# Patient Record
Sex: MALE | Race: WHITE | NOT HISPANIC OR LATINO | URBAN - METROPOLITAN AREA
[De-identification: names, ages, dates, MRNs, and addresses within clinical notes are randomized per-mention and may not be internally consistent; named-entity substitution may affect disease eponyms.]

---

## 2017-01-05 DIAGNOSIS — G89.29 CHRONIC RADICULAR CERVICAL PAIN: Primary | ICD-10-CM

## 2017-01-05 DIAGNOSIS — M54.12 CHRONIC RADICULAR CERVICAL PAIN: Primary | ICD-10-CM

## 2017-01-05 DIAGNOSIS — Z51.81 THERAPEUTIC DRUG MONITORING: ICD-10-CM

## 2017-01-12 ENCOUNTER — LAB (OUTPATIENT)
Dept: LAB | Facility: HOSPITAL | Age: 72
End: 2017-01-12

## 2017-01-12 ENCOUNTER — OFFICE VISIT (OUTPATIENT)
Dept: PALLIATIVE CARE | Facility: CLINIC | Age: 72
End: 2017-01-12

## 2017-01-12 VITALS
HEART RATE: 81 BPM | WEIGHT: 146.3 LBS | DIASTOLIC BLOOD PRESSURE: 63 MMHG | BODY MASS INDEX: 24.37 KG/M2 | RESPIRATION RATE: 18 BRPM | SYSTOLIC BLOOD PRESSURE: 137 MMHG | HEIGHT: 65 IN

## 2017-01-12 DIAGNOSIS — M62.838 NECK MUSCLE SPASM: ICD-10-CM

## 2017-01-12 DIAGNOSIS — Z51.81 THERAPEUTIC DRUG MONITORING: ICD-10-CM

## 2017-01-12 DIAGNOSIS — M54.12 CHRONIC RADICULAR CERVICAL PAIN: ICD-10-CM

## 2017-01-12 DIAGNOSIS — R06.00 DYSPNEA AND RESPIRATORY ABNORMALITIES: ICD-10-CM

## 2017-01-12 DIAGNOSIS — M50.20 CERVICAL DISCOGENIC PAIN SYNDROME: ICD-10-CM

## 2017-01-12 DIAGNOSIS — R07.1 CHEST PAIN VARYING WITH BREATHING: Primary | ICD-10-CM

## 2017-01-12 DIAGNOSIS — R06.89 DYSPNEA AND RESPIRATORY ABNORMALITIES: ICD-10-CM

## 2017-01-12 DIAGNOSIS — G89.29 CHRONIC RADICULAR CERVICAL PAIN: ICD-10-CM

## 2017-01-12 PROBLEM — F41.9 ANXIETY: Status: ACTIVE | Noted: 2017-01-12

## 2017-01-12 PROBLEM — R09.89 AIR HUNGER: Status: ACTIVE | Noted: 2017-01-12

## 2017-01-12 PROBLEM — R53.81 DECLINING FUNCTIONAL STATUS: Status: ACTIVE | Noted: 2017-01-12

## 2017-01-12 PROBLEM — M47.812 OSTEOARTHRITIS OF CERVICAL SPINE: Status: ACTIVE | Noted: 2017-01-12

## 2017-01-12 PROBLEM — J84.112 IPF (IDIOPATHIC PULMONARY FIBROSIS) (HCC): Status: ACTIVE | Noted: 2017-01-12

## 2017-01-12 PROBLEM — E03.9 HYPOTHYROIDISM: Status: ACTIVE | Noted: 2017-01-12

## 2017-01-12 PROBLEM — M17.9 OSTEOARTHRITIS OF KNEE: Status: ACTIVE | Noted: 2017-01-12

## 2017-01-12 PROBLEM — G25.81 RESTLESS LEGS SYNDROME: Status: ACTIVE | Noted: 2017-01-12

## 2017-01-12 LAB
AMPHET+METHAMPHET UR QL: NEGATIVE
AMPHETAMINES UR QL: NEGATIVE
BARBITURATES UR QL SCN: NEGATIVE
BENZODIAZ UR QL SCN: POSITIVE
BUPRENORPHINE SERPL-MCNC: NEGATIVE NG/ML
CANNABINOIDS SERPL QL: NEGATIVE
COCAINE UR QL: NEGATIVE
METHADONE UR QL SCN: NEGATIVE
OPIATES UR QL: POSITIVE
OXYCODONE UR QL SCN: POSITIVE
PCP UR QL SCN: NEGATIVE
PROPOXYPH UR QL: NEGATIVE
TRICYCLICS UR QL SCN: NEGATIVE

## 2017-01-12 PROCEDURE — 99215 OFFICE O/P EST HI 40 MIN: CPT | Performed by: INTERNAL MEDICINE

## 2017-01-12 PROCEDURE — 99497 ADVNCD CARE PLAN 30 MIN: CPT | Performed by: INTERNAL MEDICINE

## 2017-01-12 PROCEDURE — 80306 DRUG TEST PRSMV INSTRMNT: CPT | Performed by: INTERNAL MEDICINE

## 2017-01-12 RX ORDER — DIAZEPAM 5 MG/1
5 TABLET ORAL 2 TIMES DAILY PRN
Qty: 60 TABLET | Refills: 1 | Status: SHIPPED | OUTPATIENT
Start: 2017-01-12 | End: 2017-03-16 | Stop reason: SDUPTHER

## 2017-01-12 RX ORDER — PREDNISONE 1 MG/1
1 TABLET ORAL EVERY OTHER DAY
COMMUNITY
Start: 2017-01-07

## 2017-01-12 RX ORDER — MORPHINE SULFATE 30 MG/1
30 TABLET, FILM COATED, EXTENDED RELEASE ORAL EVERY 8 HOURS SCHEDULED
Qty: 90 TABLET | Refills: 0 | Status: SHIPPED | OUTPATIENT
Start: 2017-01-12 | End: 2017-01-12 | Stop reason: SDUPTHER

## 2017-01-12 RX ORDER — MORPHINE SULFATE 30 MG/1
30 TABLET, FILM COATED, EXTENDED RELEASE ORAL EVERY 8 HOURS SCHEDULED
Qty: 90 TABLET | Refills: 0 | Status: SHIPPED | OUTPATIENT
Start: 2017-02-11 | End: 2017-03-16 | Stop reason: SDUPTHER

## 2017-01-12 RX ORDER — OMEPRAZOLE 20 MG/1
20 CAPSULE, DELAYED RELEASE ORAL DAILY
COMMUNITY

## 2017-01-12 RX ORDER — OXYCODONE AND ACETAMINOPHEN 10; 325 MG/1; MG/1
1 TABLET ORAL EVERY 4 HOURS PRN
Qty: 180 TABLET | Refills: 0 | Status: SHIPPED | OUTPATIENT
Start: 2017-01-12 | End: 2017-01-12 | Stop reason: SDUPTHER

## 2017-01-12 RX ORDER — OXYCODONE AND ACETAMINOPHEN 10; 325 MG/1; MG/1
1 TABLET ORAL EVERY 4 HOURS PRN
Qty: 180 TABLET | Refills: 0 | Status: SHIPPED | OUTPATIENT
Start: 2017-02-28 | End: 2017-03-16 | Stop reason: SDUPTHER

## 2017-01-12 NOTE — LETTER
"January 12, 2017     Vivian Lim DO  [address] [place] [address] [address]  [facility] [address]    Patient: Paras Coles   YOB: 1945   Date of Visit: 1/12/2017       Dear Dr. Carina DO:    Thank you for referring Paras Coles to me for evaluation. Below are the relevant portions of my assessment and plan of care.    If you have questions, please do not hesitate to call me. I look forward to following Paras along with you.         Sincerely,        Lena Rehman MD        CC: MD Lena Morley MD  1/12/2017  2:30 PM  Sign at close encounter  Subjective   Paras Coles is a 71 y.o. male.     History of Present Illness    71yowm with idiopathic pulmonary fibrosis and chronic neck and back pain related to severe DJD of cervical spine with frequent neuropathic pain and numbness of bilateral hands. He has been on chronic opioid therapy for cervical spine DJD and has had conservative increase in opioid to also address air hunger. Has been symptomatically stable and still able to put in a few hours of work weekly.    Pain:  Increase in mid back pain, worse with breathing and noted with periods of air hunger.  Feels different from MSK pain of c-spine DDD.  Feels like \"it's my lungs\".  Achiness on inside.  Using Percocet 4-6 times daily.    C/o reflux with metallic taste over past week.  Dyspepsia and sharp esophageal pains after eating.  Started OTC PPI this week.      Dyspnea:  Overall a little worse but no acute cough, fever.  F/u with Dr. Carty every 3 months.    Advance care planning discussion (20min):  Pt brought copy of LW.  Wife, Natalya \"Shruti\", present.  She understands he does not want prolonged ventilation, however she is uncomfortable with \"no CPR\".  He agrees with her as he has good quality of life right now.  We discussed that palliative services are available at all [facility].        The following portions of the patient's " history were reviewed and updated as appropriate: allergies, current medications, past family history, past medical history, past social history, past surgical history and problem list.    Review of Systems    Last BM: daily    ESAS:  See flowsheet.    KPS: 70 - Cares for self; unable to carry on normal activity or do normal work     PPS: See flowsheet    ECOG: (1) Restricted in physically strenuous activity, ambulatory and able to do work of light nature    [facility] [id number].  No concerns    Opioid Risk Tool low risk    Medication counts did not bring    Pain Management Panel     Pain Management Panel Latest Ref Rng 1/12/2017 11/17/2016    AMPHETAMINES SCREEN, URINE Negative Negative Negative    BARBITURATES SCREEN Negative Negative Negative    BENZODIAZEPINE SCREEN, URINE Negative Positive(A) Positive(A)    BUPRENORPHINE Negative Negative Negative    COCAINE SCREEN, URINE Negative Negative Negative    METHADONE SCREEN, URINE Negative Negative Negative    METHAMPHETAMINE UR Negative Negative Negative          UDS: positive for prescription drugs    SUSAN-7: 0    PHQ-9:  See flowsheet    Living Will:    Yes- name(s)- Natalya Coles  on file    Objective   Physical Exam   General  WNWD, supplemental oxygen, conversant, NAD  PULM:  Bilateral crackles, good effort, reproducible back pain with deep respirations  CV:  RRR no m/r/g, trace LLE pretibial edema  GI:  +BS, soft, ntnd  MSK:  RLE amputee with prosthesis, normal gait, able to turn head 45 deg to left to talk but mainly moves whole body to look to right  Psych:   Normal affect  Neuro:  No hyperalgesia, no tremor, no rigidity    No results found for: NA, K, CL, CO2, BUN, CREATININE, LABGLOM, GLUCOSE, CALCIUM    Lab Results   Component Value Date    BUPRENORSCNU Negative 01/12/2017    PROPOXSCN Negative 01/12/2017    OXYCODONESCN Positive (A) 01/12/2017    BARBITSCNUR Negative 01/12/2017    LABMETHSCN Negative 01/12/2017    TRICYCLICSCN Negative 01/12/2017     LABBENZSCN Positive (A) 01/12/2017    AMPHETSCREEN Negative 01/12/2017    LABOPIASCN Positive (A) 01/12/2017    METAMPSCNUR Negative 01/12/2017    COCAINEUR Negative 01/12/2017    PCPUR Negative 01/12/2017    THCURSCR Negative 01/12/2017       Assessment/Plan   Paras was seen today for pain and shortness of breath.    Diagnoses and all orders for this visit:    Chest pain varying with breathing  -     Discontinue: Morphine (MS CONTIN) 30 MG 12 hr tablet; Take 1 tablet by mouth Every 8 (Eight) Hours.  -     Morphine (MS CONTIN) 30 MG 12 hr tablet; Take 1 tablet by mouth Every 8 (Eight) Hours.    Dyspnea and respiratory abnormalities  -     Discontinue: Morphine (MS CONTIN) 30 MG 12 hr tablet; Take 1 tablet by mouth Every 8 (Eight) Hours.  -     Discontinue: oxyCODONE-acetaminophen (PERCOCET)  MG per tablet; Take 1 tablet by mouth Every 4 (Four) Hours As Needed for moderate pain (4-6).  -     Morphine (MS CONTIN) 30 MG 12 hr tablet; Take 1 tablet by mouth Every 8 (Eight) Hours.  -     oxyCODONE-acetaminophen (PERCOCET)  MG per tablet; Take 1 tablet by mouth Every 4 (Four) Hours As Needed for moderate pain (4-6).    Cervical discogenic pain syndrome  -     Discontinue: oxyCODONE-acetaminophen (PERCOCET)  MG per tablet; Take 1 tablet by mouth Every 4 (Four) Hours As Needed for moderate pain (4-6).  -     oxyCODONE-acetaminophen (PERCOCET)  MG per tablet; Take 1 tablet by mouth Every 4 (Four) Hours As Needed for moderate pain (4-6).    Neck muscle spasm  -     diazePAM (VALIUM) 5 MG tablet; Take 1 tablet by mouth 2 (Two) Times a Day As Needed for muscle spasms.    Other orders  -     Full Code    Increased MSSR from 30mg BID to 30mg q8h.      No other medication changes.    Advance care planning discussion as above.    Follow up in 2 months.  Noted that patient will be in [place] for the month of February

## 2017-01-12 NOTE — PROGRESS NOTES
"Subjective   Paras Coles is a 71 y.o. male.     History of Present Illness    71yowm with idiopathic pulmonary fibrosis and chronic neck and back pain related to severe DJD of cervical spine with frequent neuropathic pain and numbness of bilateral hands. He has been on chronic opioid therapy for cervical spine DJD and has had conservative increase in opioid to also address air hunger. Has been symptomatically stable and still able to put in a few hours of work weekly.    Pain:  Increase in mid back pain, worse with breathing and noted with periods of air hunger.  Feels different from MSK pain of c-spine DDD.  Feels like \"it's my lungs\".  Achiness on inside.  Using Percocet 4-6 times daily.    C/o reflux with metallic taste over past week.  Dyspepsia and sharp esophageal pains after eating.  Started OTC PPI this week.      Dyspnea:  Overall a little worse but no acute cough, fever.  F/u with Dr. Carty every 3 months.    Advance care planning discussion (20min):  Pt brought copy of LW.  Wife, Natalya \"Shruti\", present.  She understands he does not want prolonged ventilation, however she is uncomfortable with \"no CPR\".  He agrees with her as he has good quality of life right now.  We discussed that palliative services are available at all [facility].        The following portions of the patient's history were reviewed and updated as appropriate: allergies, current medications, past family history, past medical history, past social history, past surgical history and problem list.    Review of Systems    Last BM: daily    ESAS:  See flowsheet.    KPS: 70 - Cares for self; unable to carry on normal activity or do normal work     PPS: See flowsheet    ECOG: (1) Restricted in physically strenuous activity, ambulatory and able to do work of light nature    [facility] [id number].  No concerns    Opioid Risk Tool low risk    Medication counts did not bring    Pain Management Panel     Pain Management Panel " Latest Ref Rng 1/12/2017 11/17/2016    AMPHETAMINES SCREEN, URINE Negative Negative Negative    BARBITURATES SCREEN Negative Negative Negative    BENZODIAZEPINE SCREEN, URINE Negative Positive(A) Positive(A)    BUPRENORPHINE Negative Negative Negative    COCAINE SCREEN, URINE Negative Negative Negative    METHADONE SCREEN, URINE Negative Negative Negative    METHAMPHETAMINE UR Negative Negative Negative          UDS: positive for prescription drugs    SUSAN-7: 0    PHQ-9:  See flowsheet    Living Will:    Yes- name(s)- Natalya Coles  on file    Objective   Physical Exam   General  WNWD, supplemental oxygen, conversant, NAD  PULM:  Bilateral crackles, good effort, reproducible back pain with deep respirations  CV:  RRR no m/r/g, trace LLE pretibial edema  GI:  +BS, soft, ntnd  MSK:  RLE amputee with prosthesis, normal gait, able to turn head 45 deg to left to talk but mainly moves whole body to look to right  Psych:   Normal affect  Neuro:  No hyperalgesia, no tremor, no rigidity    No results found for: NA, K, CL, CO2, BUN, CREATININE, LABGLOM, GLUCOSE, CALCIUM    Lab Results   Component Value Date    BUPRENORSCNU Negative 01/12/2017    PROPOXSCN Negative 01/12/2017    OXYCODONESCN Positive (A) 01/12/2017    BARBITSCNUR Negative 01/12/2017    LABMETHSCN Negative 01/12/2017    TRICYCLICSCN Negative 01/12/2017    LABBENZSCN Positive (A) 01/12/2017    AMPHETSCREEN Negative 01/12/2017    LABOPIASCN Positive (A) 01/12/2017    METAMPSCNUR Negative 01/12/2017    COCAINEUR Negative 01/12/2017    PCPUR Negative 01/12/2017    THCURSCR Negative 01/12/2017       Assessment/Plan   Paras was seen today for pain and shortness of breath.    Diagnoses and all orders for this visit:    Chest pain varying with breathing  -     Discontinue: Morphine (MS CONTIN) 30 MG 12 hr tablet; Take 1 tablet by mouth Every 8 (Eight) Hours.  -     Morphine (MS CONTIN) 30 MG 12 hr tablet; Take 1 tablet by mouth Every 8 (Eight) Hours.    Dyspnea  and respiratory abnormalities  -     Discontinue: Morphine (MS CONTIN) 30 MG 12 hr tablet; Take 1 tablet by mouth Every 8 (Eight) Hours.  -     Discontinue: oxyCODONE-acetaminophen (PERCOCET)  MG per tablet; Take 1 tablet by mouth Every 4 (Four) Hours As Needed for moderate pain (4-6).  -     Morphine (MS CONTIN) 30 MG 12 hr tablet; Take 1 tablet by mouth Every 8 (Eight) Hours.  -     oxyCODONE-acetaminophen (PERCOCET)  MG per tablet; Take 1 tablet by mouth Every 4 (Four) Hours As Needed for moderate pain (4-6).    Cervical discogenic pain syndrome  -     Discontinue: oxyCODONE-acetaminophen (PERCOCET)  MG per tablet; Take 1 tablet by mouth Every 4 (Four) Hours As Needed for moderate pain (4-6).  -     oxyCODONE-acetaminophen (PERCOCET)  MG per tablet; Take 1 tablet by mouth Every 4 (Four) Hours As Needed for moderate pain (4-6).    Neck muscle spasm  -     diazePAM (VALIUM) 5 MG tablet; Take 1 tablet by mouth 2 (Two) Times a Day As Needed for muscle spasms.    Other orders  -     Full Code    Increased MSSR from 30mg BID to 30mg q8h.      No other medication changes.    Advance care planning discussion as above.    Follow up in 2 months.  Noted that patient will be in [place] for the month of February

## 2017-01-12 NOTE — MR AVS SNAPSHOT
Paras Coles   1/12/2017 12:00 PM   Office Visit    Dept Phone:  [phone]   Encounter #:  [id number]    Provider:  Lena Rehman MD   Department:  [facility]                Your Full Care Plan              Today's Medication Changes          These changes are accurate as of: 1/12/17  1:28 PM.  If you have any questions, ask your nurse or doctor.               Medication(s)that have changed:     Morphine 30 MG 12 hr tablet   Commonly known as:  MS CONTIN   Take 1 tablet by mouth Every 8 (Eight) Hours.   Start taking on:  2/11/2017   What changed:    - when to take this  - Another medication with the same name was removed. Continue taking this medication, and follow the directions you see here.   Changed by:  Lena Rehman MD       oxyCODONE-acetaminophen  MG per tablet   Commonly known as:  PERCOCET   Take 1 tablet by mouth Every 4 (Four) Hours As Needed for moderate pain (4-6).   Start taking on:  2/28/2017   What changed:  Another medication with the same name was removed. Continue taking this medication, and follow the directions you see here.   Changed by:  Lena Rehman MD       predniSONE 5 MG tablet   Commonly known as:  DELTASONE   Take 1 tablet by mouth Every Other Day.   What changed:  Another medication with the same name was removed. Continue taking this medication, and follow the directions you see here.   Changed by:  Lena Rehman MD            Where to Get Your Medications      You can get these medications from any pharmacy     Bring a paper prescription for each of these medications     diazePAM 5 MG tablet    Morphine 30 MG 12 hr tablet    oxyCODONE-acetaminophen  MG per tablet                  Your Updated Medication List          This list is accurate as of: 1/12/17  1:28 PM.  Always use your most recent med list.                albuterol (2.5 MG/3ML) 0.083% nebulizer solution   Commonly known as:  PROVENTIL       AVALIDE  150-12.5 MG tablet   Generic drug:  irbesartan-hydrochlorothiazide       bisoprolol 5 MG tablet   Commonly known as:  ZEBeta       diazePAM 5 MG tablet   Commonly known as:  VALIUM   Take 1 tablet by mouth 2 (Two) Times a Day As Needed for muscle spasms.       ibuprofen 800 MG tablet   Commonly known as:  ADVIL,MOTRIN   Take 1 tablet by mouth Every 8 (Eight) Hours As Needed (for 3 days maximum PRN arthritis pain).       Morphine 30 MG 12 hr tablet   Commonly known as:  MS CONTIN   Take 1 tablet by mouth Every 8 (Eight) Hours.   Start taking on:  2/11/2017       O2   Commonly known as:  OXYGEN       omeprazole 20 MG capsule   Commonly known as:  priLOSEC       oxyCODONE-acetaminophen  MG per tablet   Commonly known as:  PERCOCET   Take 1 tablet by mouth Every 4 (Four) Hours As Needed for moderate pain (4-6).   Start taking on:  2/28/2017       PLAVIX 75 MG tablet   Generic drug:  clopidogrel       predniSONE 5 MG tablet   Commonly known as:  DELTASONE       SYNTHROID 100 MCG tablet   Generic drug:  levothyroxine       ZOFRAN ODT 4 MG disintegrating tablet   Generic drug:  ondansetron ODT               We Performed the Following     Full Code       You Were Diagnosed With        Codes Comments    Chest pain varying with breathing    -  Primary ICD-10-CM: R07.9  ICD-9-CM: 786.50     Dyspnea and respiratory abnormalities     ICD-10-CM: R06.00, R06.89  ICD-9-CM: 786.09     Cervical discogenic pain syndrome     ICD-10-CM: M50.20  ICD-9-CM: 722.0     Neck muscle spasm     ICD-10-CM: M62.838  ICD-9-CM: 728.85       Instructions     None    Patient Instructions History      Upcoming Appointments     Visit Type Date Time Department    FOLLOW UP 1/12/2017 12:00 PM [facility]    LAB 1/12/2017 12:45 PM  [facility]    FOLLOW UP 3/16/2017 10:00 AM [facility]      MyChart Signup     Our records indicate that you have an active [employer] account.    You can view your After Visit Summary by  "going to [employer] and logging in with your [employer] username and password.  If you don't have a [employer] username and password but a parent or guardian has access to your record, the parent or guardian should login with their own [employer] username and password and access your record to view the After Visit Summary.    If you have questions, you can email [email or web address] or call [phone] to talk to our [employer] staff.  Remember, [employer] is NOT to be used for urgent needs.  For medical emergencies, dial 911.               Other Info from Your Visit           Your Appointments     Mar 16, 2017 10:00 AM EDT   Follow Up with Lena Rehman MD   [facility] (--)    [address] [place] [address] [address]  [place] [address]   [phone]           Arrive 15 minutes prior to appointment.              Allergies     Morphine  Nausea Only      Reason for Visit     Pain     Shortness of Breath           Vital Signs     Blood Pressure Pulse Respirations Height Weight Body Mass Index    137/63 81 18 65\" (165.1 cm) 146 lb 4.8 oz (66.4 kg) 24.35 kg/m2    Smoking Status                   Former Smoker           Problems and Diagnoses Noted     Air hunger    Anxiety problem    Bulging disc of backbone    Chronic pain    Declining functional status    Trouble breathing    Underactive thyroid    Scarring of lung    Degenerative arthritis of cervical spine    Degenerative arthritis of knee    Restless legs syndrome    Chest pain varying with breathing    -  Primary    Neck muscle spasm            "

## 2017-01-12 NOTE — PROGRESS NOTES
Visit with Dr. Rehman. Pt has his wife, Shruti with him today.  Pt having increased pain and shortness of breath. Treatment plan discussed to add additional pain med at mid day. Pt is able to work a few hours and able to go out with his wife to eat. Pt had brought his wife in to continue advanced care discussion. Shruti shares that if pt were to have a heart attack, she would be unable to let him go without allowing EMTs to do CPR as he still has quality of life at this time. Shruti is very supportive of pt and his wishes but since the pt is still functioning, no resusciation is very difficult. Validated how difficult these decisions are to make at this time as situations change. Pt also is not quite at the point of no resuscitation but trying to take weight of decisions off of his wife. . Dr. Rehman suggested pt talk with lung doctor about if he would even be able to tolerate a ventilator if this were to be the scenario. Pt does have a living will. Provided the couple with a copy of the EMS/DNR for them to review.  Couple are planning a trip to [place] through the month of February.

## 2017-03-16 ENCOUNTER — OFFICE VISIT (OUTPATIENT)
Dept: PALLIATIVE CARE | Facility: CLINIC | Age: 72
End: 2017-03-16

## 2017-03-16 VITALS
OXYGEN SATURATION: 90 % | WEIGHT: 146 LBS | DIASTOLIC BLOOD PRESSURE: 65 MMHG | TEMPERATURE: 99.7 F | BODY MASS INDEX: 24.3 KG/M2 | RESPIRATION RATE: 18 BRPM | HEART RATE: 90 BPM | SYSTOLIC BLOOD PRESSURE: 142 MMHG

## 2017-03-16 DIAGNOSIS — R06.89 DYSPNEA AND RESPIRATORY ABNORMALITIES: ICD-10-CM

## 2017-03-16 DIAGNOSIS — M50.20 CERVICAL DISCOGENIC PAIN SYNDROME: ICD-10-CM

## 2017-03-16 DIAGNOSIS — R06.00 DYSPNEA AND RESPIRATORY ABNORMALITIES: ICD-10-CM

## 2017-03-16 DIAGNOSIS — M62.838 NECK MUSCLE SPASM: ICD-10-CM

## 2017-03-16 DIAGNOSIS — R09.89 AIR HUNGER: Primary | ICD-10-CM

## 2017-03-16 DIAGNOSIS — R07.1 CHEST PAIN VARYING WITH BREATHING: ICD-10-CM

## 2017-03-16 PROCEDURE — 99214 OFFICE O/P EST MOD 30 MIN: CPT | Performed by: INTERNAL MEDICINE

## 2017-03-16 RX ORDER — MORPHINE SULFATE 30 MG/1
30 TABLET, FILM COATED, EXTENDED RELEASE ORAL EVERY 8 HOURS SCHEDULED
Qty: 90 TABLET | Refills: 0 | Status: SHIPPED | OUTPATIENT
Start: 2017-03-29 | End: 2017-04-28

## 2017-03-16 RX ORDER — CEFDINIR 300 MG/1
CAPSULE ORAL
Refills: 0 | COMMUNITY
Start: 2017-03-09

## 2017-03-16 RX ORDER — DIAZEPAM 5 MG/1
5 TABLET ORAL 2 TIMES DAILY PRN
Qty: 60 TABLET | Refills: 0 | Status: SHIPPED | OUTPATIENT
Start: 2017-05-08 | End: 2017-06-07

## 2017-03-16 RX ORDER — OXYCODONE AND ACETAMINOPHEN 10; 325 MG/1; MG/1
1 TABLET ORAL EVERY 4 HOURS PRN
Qty: 180 TABLET | Refills: 0 | Status: SHIPPED | OUTPATIENT
Start: 2017-05-08 | End: 2017-06-07

## 2017-03-16 RX ORDER — MORPHINE SULFATE 30 MG/1
30 TABLET, FILM COATED, EXTENDED RELEASE ORAL EVERY 8 HOURS SCHEDULED
Qty: 90 TABLET | Refills: 0 | Status: SHIPPED | OUTPATIENT
Start: 2017-04-28 | End: 2017-05-28

## 2017-03-16 RX ORDER — OXYCODONE AND ACETAMINOPHEN 10; 325 MG/1; MG/1
1 TABLET ORAL EVERY 4 HOURS PRN
Qty: 180 TABLET | Refills: 0 | Status: SHIPPED | OUTPATIENT
Start: 2017-04-08 | End: 2017-05-08

## 2017-03-16 RX ORDER — DIAZEPAM 5 MG/1
5 TABLET ORAL 2 TIMES DAILY PRN
Qty: 56 TABLET | Refills: 0 | Status: SHIPPED | OUTPATIENT
Start: 2017-04-10 | End: 2017-05-08

## 2017-03-16 RX ORDER — AZITHROMYCIN 250 MG/1
TABLET, FILM COATED ORAL
Refills: 0 | COMMUNITY
Start: 2017-03-09

## 2017-03-16 NOTE — PATIENT INSTRUCTIONS
Call [phone] for any questions regarding medication or plan of care.     Clinic days are Tuesday and Thursdays at this time. If there is an urgent question, please ask that an on call clinician call you back. Otherwise, we will return calls and process prescription requests during clinic hours.     Call [phone] only for scheduling issues.

## 2017-03-16 NOTE — PROGRESS NOTES
Subjective   Paras Coles is a 71 y.o. male.     History of Present Illness   71yowm with idiopathic pulmonary fibrosis and chronic neck and back pain related to severe DJD of cervical spine with frequent neuropathic pain and numbness of bilateral hands. He has been on chronic opioid therapy for cervical spine DJD and has had conservative increase in opioid to also address air hunger. Has still able to put in a few hours of work weekly.    Acute URI symptoms with sore throat, low grade fevver, chills, sweats, headache for 1-2 weeks.  Seen by Dr. Carty 3 days ago, got IVFs and started antibiotics.  One day improvement in energy with IVFs.  Increasing dyspnea with conversation since illness.  Generalized achiness.  No nausea/vomiting/diarrhea/rash.    No change chronic pain.    Today, comes in with EMS/DNR paperwork.  Wife with him today.  They discussed it together and agree that it appropriate for them.    The following portions of the patient's history were reviewed and updated as appropriate: allergies, current medications, past family history, past medical history, past social history, past surgical history and problem list.    Review of Systems    Last BM: denies constipation    SUSAN-7: see scanned form    PHQ-9:  See flowsheet    PPS/ESAS:  See flowsheets    KPS: 80 - Normal activity with effort; some signs or symptoms of disease    ECOG: (1) Restricted in physically strenuous activity, ambulatory and able to do work of light nature    Opioid Risk Tool low      ISAURO/Medication Counts:  Reviewed.  See flowsheets and scanned forms.    Objective   Physical Exam   General:  Tired appearing, flushed  HEENT:  nontender submandibular LAD, OP clear, no sinus tenderness, eyes slightly red, hard of hearing (no aids in today)  PULM:  Bilateral rhonchi to mid chest, no wheeze, mild work of breathing noted with sighs, dyspneic at end of sentences  CV:  RRR no m/r/g  GI:  +BS, soft, ntnd  Psych:  Flat affect  Neuro:   No tremor, alert but tired    No results found for: NA, K, CL, CO2, BUN, CREATININE, LABGLOM, GLUCOSE, CALCIUM    Lab Results   Component Value Date    BUPRENORSCNU Negative 01/12/2017    PROPOXSCN Negative 01/12/2017    OXYCODONESCN Positive (A) 01/12/2017    BARBITSCNUR Negative 01/12/2017    LABMETHSCN Negative 01/12/2017    TRICYCLICSCN Negative 01/12/2017    LABBENZSCN Positive (A) 01/12/2017    AMPHETSCREEN Negative 01/12/2017    LABOPIASCN Positive (A) 01/12/2017    METAMPSCNUR Negative 01/12/2017    COCAINEUR Negative 01/12/2017    PCPUR Negative 01/12/2017    THCURSCR Negative 01/12/2017       Assessment/Plan   Paras was seen today for shortness of breath.    Diagnoses and all orders for this visit:    Air hunger    Dyspnea and respiratory abnormalities  -     oxyCODONE-acetaminophen (PERCOCET)  MG per tablet; Take 1 tablet by mouth Every 4 (Four) Hours As Needed for Moderate Pain (4-6) for up to 30 days.  -     Morphine (MS CONTIN) 30 MG 12 hr tablet; Take 1 tablet by mouth Every 8 (Eight) Hours for 30 days.  -     oxyCODONE-acetaminophen (PERCOCET)  MG per tablet; Take 1 tablet by mouth Every 4 (Four) Hours As Needed for Severe Pain (7-10) for up to 30 days.  -     Morphine (MS CONTIN) 30 MG 12 hr tablet; Take 1 tablet by mouth Every 8 (Eight) Hours for 30 days.    Cervical discogenic pain syndrome  -     oxyCODONE-acetaminophen (PERCOCET)  MG per tablet; Take 1 tablet by mouth Every 4 (Four) Hours As Needed for Moderate Pain (4-6) for up to 30 days.    Chest pain varying with breathing  -     Morphine (MS CONTIN) 30 MG 12 hr tablet; Take 1 tablet by mouth Every 8 (Eight) Hours for 30 days.  -     Morphine (MS CONTIN) 30 MG 12 hr tablet; Take 1 tablet by mouth Every 8 (Eight) Hours for 30 days.    Neck muscle spasm  -     diazePAM (VALIUM) 5 MG tablet; Take 1 tablet by mouth 2 (Two) Times a Day As Needed for Muscle Spasms for up to 28 days.  -     diazePAM (VALIUM) 5 MG tablet; Take 1  tablet by mouth 2 (Two) Times a Day As Needed for Muscle Spasms for up to 30 days.        Patient was counseled on narcotic safety and patient responsibility of medication against theft or stolen medications.  No early refills requests will be honored for lost or stolen medication.  Patient was counseled to take medications only as prescribed or instructed by prescribing physician.  Patient was counseled regarding risk of overdose and polypharmacy.  Patient was advised against using alcohol or driving while on narcotic medication.  Patient was advised of opioid side effects of constipation and potential altered sensorium.  Advised of long term effects of sleep apnea, hypogonadism and fatigue, osteoporosis, risk of abuse and addiction.  Advised to be supervised for first few days while on new medication or dosages.    Discussion:  No change to medication regimen.  Refills written for next 2 months.  Advised them to call Dr. Carty today re: no improvement in acute illness symptoms.    EMS DNR notarized by LCSW today.    Follow up in 4 months